# Patient Record
Sex: MALE | Race: OTHER
[De-identification: names, ages, dates, MRNs, and addresses within clinical notes are randomized per-mention and may not be internally consistent; named-entity substitution may affect disease eponyms.]

---

## 2020-03-30 ENCOUNTER — HOSPITAL ENCOUNTER (EMERGENCY)
Dept: HOSPITAL 61 - ER | Age: 45
Discharge: HOME | End: 2020-03-30
Payer: SELF-PAY

## 2020-03-30 VITALS — DIASTOLIC BLOOD PRESSURE: 80 MMHG | SYSTOLIC BLOOD PRESSURE: 127 MMHG

## 2020-03-30 VITALS — BODY MASS INDEX: 29.76 KG/M2 | WEIGHT: 189.6 LBS | HEIGHT: 67 IN

## 2020-03-30 DIAGNOSIS — E78.00: ICD-10-CM

## 2020-03-30 DIAGNOSIS — I10: ICD-10-CM

## 2020-03-30 DIAGNOSIS — E11.9: ICD-10-CM

## 2020-03-30 DIAGNOSIS — R07.89: Primary | ICD-10-CM

## 2020-03-30 LAB
ALBUMIN SERPL-MCNC: 4.7 G/DL (ref 3.4–5)
ALBUMIN/GLOB SERPL: 1.2 {RATIO} (ref 1–1.7)
ALP SERPL-CCNC: 86 U/L (ref 46–116)
ALT SERPL-CCNC: 51 U/L (ref 16–63)
ANION GAP SERPL CALC-SCNC: 8 MMOL/L (ref 6–14)
AST SERPL-CCNC: 29 U/L (ref 15–37)
BASOPHILS # BLD AUTO: 0.1 X10^3/UL (ref 0–0.2)
BASOPHILS NFR BLD: 1 % (ref 0–3)
BILIRUB SERPL-MCNC: 0.5 MG/DL (ref 0.2–1)
BUN SERPL-MCNC: 15 MG/DL (ref 8–26)
BUN/CREAT SERPL: 17 (ref 6–20)
CALCIUM SERPL-MCNC: 10.1 MG/DL (ref 8.5–10.1)
CHLORIDE SERPL-SCNC: 98 MMOL/L (ref 98–107)
CO2 SERPL-SCNC: 29 MMOL/L (ref 21–32)
CREAT SERPL-MCNC: 0.9 MG/DL (ref 0.7–1.3)
EOSINOPHIL NFR BLD: 0.1 X10^3/UL (ref 0–0.7)
EOSINOPHIL NFR BLD: 1 % (ref 0–3)
ERYTHROCYTE [DISTWIDTH] IN BLOOD BY AUTOMATED COUNT: 13.9 % (ref 11.5–14.5)
GFR SERPLBLD BASED ON 1.73 SQ M-ARVRAT: 91.7 ML/MIN
GLOBULIN SER-MCNC: 3.9 G/DL (ref 2.2–3.8)
GLUCOSE SERPL-MCNC: 131 MG/DL (ref 70–99)
HCT VFR BLD CALC: 47.3 % (ref 39–53)
HGB BLD-MCNC: 16.1 G/DL (ref 13–17.5)
LYMPHOCYTES # BLD: 1.5 X10^3/UL (ref 1–4.8)
LYMPHOCYTES NFR BLD AUTO: 10 % (ref 24–48)
MCH RBC QN AUTO: 28 PG (ref 25–35)
MCHC RBC AUTO-ENTMCNC: 34 G/DL (ref 31–37)
MCV RBC AUTO: 83 FL (ref 79–100)
MONO #: 0.7 X10^3/UL (ref 0–1.1)
MONOCYTES NFR BLD: 5 % (ref 0–9)
NEUT #: 12.4 X10^3/UL (ref 1.8–7.7)
NEUTROPHILS NFR BLD AUTO: 83 % (ref 31–73)
PLATELET # BLD AUTO: 315 X10^3/UL (ref 140–400)
POTASSIUM SERPL-SCNC: 4.1 MMOL/L (ref 3.5–5.1)
PROT SERPL-MCNC: 8.6 G/DL (ref 6.4–8.2)
PROTHROMBIN TIME: 13 SEC (ref 11.7–14)
RBC # BLD AUTO: 5.67 X10^6/UL (ref 4.3–5.7)
SODIUM SERPL-SCNC: 135 MMOL/L (ref 136–145)
WBC # BLD AUTO: 14.8 X10^3/UL (ref 4–11)

## 2020-03-30 PROCEDURE — 85610 PROTHROMBIN TIME: CPT

## 2020-03-30 PROCEDURE — 85025 COMPLETE CBC W/AUTO DIFF WBC: CPT

## 2020-03-30 PROCEDURE — 36415 COLL VENOUS BLD VENIPUNCTURE: CPT

## 2020-03-30 PROCEDURE — 80053 COMPREHEN METABOLIC PANEL: CPT

## 2020-03-30 PROCEDURE — 84484 ASSAY OF TROPONIN QUANT: CPT

## 2020-03-30 PROCEDURE — 93005 ELECTROCARDIOGRAM TRACING: CPT

## 2020-03-30 PROCEDURE — 71045 X-RAY EXAM CHEST 1 VIEW: CPT

## 2020-03-30 PROCEDURE — 99285 EMERGENCY DEPT VISIT HI MDM: CPT

## 2020-03-30 NOTE — RAD
CHEST AP ONLY

 

Clinical indications: Chest pain. 

 

COMPARISON: None available.

 

Findings: No acute lung infiltrate or pleural effusion or pulmonary edema 

or lung mass or pneumothorax is seen.  The heart size, pulmonary 

vasculature, mediastinum and both ingris are unremarkable. 

 

Impression: No acute radiographic abnormality is seen.

 

Electronically signed by: Elias Okeefe MD (3/30/2020 2:55 PM) [id number]

## 2020-03-30 NOTE — PHYS DOC
Past Medical History


Past Medical History:  Diabetes-Type II, Hypertension


Past Surgical History:  No Surgical History


Smoking Status:  Never Smoker


Alcohol Use:  None





Adult General


Chief Complaint


Chief Complaint:  CHEST PAIN





HPI


HPI





Patient is a 44  year old male presenting to the due to chief complaint of left-

sided chest pain has been present for the last 5 days. Patient said that he has 

a history of diabetes, high blood pressure and high cholesterol. Patient denies 

being an active smoker. Patient denies obvious injury. Patient denies radiation 

of this chest pain. Patient states that the pain is 6 out of 10.





Review of Systems


Review of Systems





Constitutional: Denies fever or chills []


Eyes: Denies change in visual acuity, redness, or eye pain []


HENT: Denies nasal congestion or sore throat []


Respiratory: Denies cough or shortness of breath []


Cardiovascular: Patient complains of chest pain


GI: Denies abdominal pain, nausea, vomiting, bloody stools or diarrhea []


: Denies dysuria or hematuria []


Musculoskeletal: Denies back pain or joint pain []


Neurologic: Denies headache, focal weakness or sensory changes []





All other systems were reviewed and found to be within normal limits, except as 

documented in this note.





Allergies


Allergies





Allergies








Coded Allergies Type Severity Reaction Last Updated Verified


 


  No Known Drug Allergies    3/30/20 No











Physical Exam


Physical Exam





Constitutional: Well developed, well nourished, no acute distress, non-toxic 

appearance. []


HENT: Normocephalic, atraumatic


Eyes: PERRLA, EOMI, conjunctiva normal, no discharge. [] 


Neck: Normal range of motion, no tenderness, supple, no stridor. [] 


Cardiovascular:Heart rate regular rhythm, no murmur []


Lungs & Thorax:  Bilateral breath sounds clear to auscultation []


Abdomen: Bowel sounds normal, soft, no tenderness, no masses, no pulsatile 

masses. [] 


Extremities: No tenderness, no cyanosis, no clubbing, ROM intact, no edema. [] 


Neurologic: Alert and oriented X 3, normal motor function, normal sensory fu

nction, no focal deficits noted. []


Psychologic: Affect normal, judgement normal, mood normal. []





Current Patient Data


Vital Signs





                                   Vital Signs








  Date Time  Temp Pulse Resp B/P (MAP) Pulse Ox O2 Delivery O2 Flow Rate FiO2


 


3/30/20 14:05 98.7 106 18 164/89 (114) 99 Room Air  





 98.7       








Lab Values





                                Laboratory Tests








Test


 3/30/20


14:38


 


White Blood Count


 14.8 x10^3/uL


(4.0-11.0)  H


 


Red Blood Count


 5.67 x10^6/uL


(4.30-5.70)


 


Hemoglobin


 16.1 g/dL


(13.0-17.5)


 


Hematocrit


 47.3 %


(39.0-53.0)


 


Mean Corpuscular Volume


 83 fL ()





 


Mean Corpuscular Hemoglobin 28 pg (25-35)  


 


Mean Corpuscular Hemoglobin


Concent 34 g/dL


(31-37)


 


Red Cell Distribution Width


 13.9 %


(11.5-14.5)


 


Platelet Count


 315 x10^3/uL


(140-400)


 


Neutrophils (%) (Auto) 83 % (31-73)  H


 


Lymphocytes (%) (Auto) 10 % (24-48)  L


 


Monocytes (%) (Auto) 5 % (0-9)  


 


Eosinophils (%) (Auto) 1 % (0-3)  


 


Basophils (%) (Auto) 1 % (0-3)  


 


Neutrophils # (Auto)


 12.4 x10^3/uL


(1.8-7.7)  H


 


Lymphocytes # (Auto)


 1.5 x10^3/uL


(1.0-4.8)


 


Monocytes # (Auto)


 0.7 x10^3/uL


(0.0-1.1)


 


Eosinophils # (Auto)


 0.1 x10^3/uL


(0.0-0.7)


 


Basophils # (Auto)


 0.1 x10^3/uL


(0.0-0.2)


 


Prothrombin Time


 13.0 SEC


(11.7-14.0)


 


Prothrombin Time INR 1.0 (0.8-1.1)  


 


Sodium Level


 135 mmol/L


(136-145)  L


 


Potassium Level


 4.1 mmol/L


(3.5-5.1)


 


Chloride Level


 98 mmol/L


()


 


Carbon Dioxide Level


 29 mmol/L


(21-32)


 


Anion Gap 8 (6-14)  


 


Blood Urea Nitrogen


 15 mg/dL


(8-26)


 


Creatinine


 0.9 mg/dL


(0.7-1.3)


 


Estimated GFR


(Cockcroft-Gault) 91.7  





 


BUN/Creatinine Ratio 17 (6-20)  


 


Glucose Level


 131 mg/dL


(70-99)  H


 


Calcium Level


 10.1 mg/dL


(8.5-10.1)


 


Total Bilirubin


 0.5 mg/dL


(0.2-1.0)


 


Aspartate Amino Transferase


(AST) 29 U/L (15-37)





 


Alanine Aminotransferase (ALT)


 51 U/L (16-63)





 


Alkaline Phosphatase


 86 U/L


()


 


Troponin I Quantitative


 < 0.017 ng/mL


(0.000-0.055)


 


Total Protein


 8.6 g/dL


(6.4-8.2)  H


 


Albumin


 4.7 g/dL


(3.4-5.0)


 


Albumin/Globulin Ratio 1.2 (1.0-1.7)  





                                Laboratory Tests


3/30/20 14:38








                                Laboratory Tests


3/30/20 14:38














EKG


EKG


EKG interpretation:


3/30/2020


HR: 96


Sinus rhythm


Regular intervals


Normal axis


Nonspecific ST changes


No STEMI





Radiology/Procedures


Radiology/Procedures


[]


Impressions:


Chest x-ray does not show any acute disease.





Course & Med Decision Making


Course & Med Decision Making


Pertinent Labs and Imaging studies reviewed. (See chart for details)





Ordered cardiac workup in the ER including chest x-ray, EKG, troponin





EKG does not show any acute changes.


Chest x-ray does not show any acute disease.


Troponin is negative.


Labs are within normal limits.





Since pain isn't present for the last 5 days, we do not have repeat troponin.


I discussed with cardiology who recommended patient have an outpatient stress 

test.


During this time of the coronavirus pandemic, it is best for the patient not to 

be admitted to the hospital for stress test.





I discussed these results and plan of care with patient.





Patient is instructed to follow up with PCP in one to 2 days.


Appropriate discharge instructions given to patient to return to the ED or to 

seek immediate medical evaluation.


Patient is instructed to return to the ED if symptoms worsen or if any concerns.





Dragon Disclaimer


Dragon Disclaimer


This electronic medical record was generated, in whole or in part, using a voice

 recognition dictation system.





Departure


Departure


Impression:  


   Primary Impression:  


   Chest pain


Disposition:  01 HOME, SELF-CARE


Condition:  STABLE


Referrals:  


NO PCP (PCP)


Patient Instructions:  Chest Pain (Nonspecific)





Additional Instructions:  


Please have your primary care physician order a stress test as an outpatient.











HEBER SULLIVAN DO           Mar 30, 2020 15:30

## 2020-03-31 NOTE — EKG
[facility]

              [address] [place] [address]

Test Date:    2020               Test Time:    14:20:23

Pat Name:     ARELY MCFARLANDTABITHA      Department:   

Patient ID:   [id number]           Room:          

Gender:       M                        Technician:   

:          [id number]               Requested By: HEBER SULLIVAN

Order Number: [id number]           Reading MD:     

                                 Measurements

Intervals                              Axis          

Rate:         96                       P:            59

GA:           168                      QRS:          28

QRSD:         74                       T:            24

QT:           310                                    

QTc:          397                                    

                           Interpretive Statements

SINUS RHYTHM

NORMAL ECG

RI6.01

No previous ECG available for comparison